# Patient Record
Sex: FEMALE | Race: OTHER
[De-identification: names, ages, dates, MRNs, and addresses within clinical notes are randomized per-mention and may not be internally consistent; named-entity substitution may affect disease eponyms.]

---

## 2020-04-30 ENCOUNTER — APPOINTMENT (OUTPATIENT)
Dept: INTERNAL MEDICINE | Facility: CLINIC | Age: 30
End: 2020-04-30
Payer: MEDICAID

## 2020-04-30 VITALS
BODY MASS INDEX: 25.61 KG/M2 | SYSTOLIC BLOOD PRESSURE: 110 MMHG | HEIGHT: 64 IN | TEMPERATURE: 98 F | HEART RATE: 81 BPM | DIASTOLIC BLOOD PRESSURE: 69 MMHG | WEIGHT: 150 LBS | RESPIRATION RATE: 16 BRPM

## 2020-04-30 DIAGNOSIS — R74.0 NONSPECIFIC ELEVATION OF LEVELS OF TRANSAMINASE AND LACTIC ACID DEHYDROGENASE [LDH]: ICD-10-CM

## 2020-04-30 DIAGNOSIS — D70.9 NEUTROPENIA, UNSPECIFIED: ICD-10-CM

## 2020-04-30 DIAGNOSIS — A90 DENGUE FEVER [CLASSICAL DENGUE]: ICD-10-CM

## 2020-04-30 DIAGNOSIS — Z83.3 FAMILY HISTORY OF DIABETES MELLITUS: ICD-10-CM

## 2020-04-30 PROCEDURE — 99203 OFFICE O/P NEW LOW 30 MIN: CPT

## 2020-04-30 NOTE — HISTORY OF PRESENT ILLNESS
[FreeTextEntry1] : This 30 y.o. woman\par who was at [facility] from 3/10/2020 - 3/15 - 2020\par for body aches and fevers, and elevated LFTs. \par \par who recently returned from [facility] on 3/8/2020.  She was there for 2 weeks on vacation.  She started getting sick while in Airplane.  NExt day, went to see PMD Dr. Atif Murphy, and then told to go hospital. \par \par She was found with Leukopenia and transaminitis in the 300s. \par \par 3/14  [facility] labs\par Hepatitis A - Ab Ig G positive\par Hepatis A IgM - negative\par hep B S Ag - negative\par hep S Ab negative\par Hep C negative \par Hep E negative\par DAVID negative\par ceruloplasmin negative\par \par Dengue fever IgM negative\par IgG positive\par \par CMV PCR negative\par \par recently started on Dicyclomine\par something for her stomach.\par \par QA  Assurance - pharmaceuticals\par \par \par Recently seen GI \par Juan Pablo Bean\par [phone]\par found with:\par \par fatty liver\par transaminitis. \par \par \par \par Currently still with some mid right abd pain\par

## 2020-04-30 NOTE — PHYSICAL EXAM
[General Appearance - Alert] : alert [General Appearance - In No Acute Distress] : in no acute distress [Sclera] : the sclera and conjunctiva were normal [PERRL With Normal Accommodation] : pupils were equal in size, round, reactive to light [Extraocular Movements] : extraocular movements were intact [Outer Ear] : the ears and nose were normal in appearance [Neck Appearance] : the appearance of the neck was normal [Oropharynx] : the oropharynx was normal with no thrush [Neck Cervical Mass (___cm)] : no neck mass was observed [Jugular Venous Distention Increased] : there was no jugular-venous distention [Thyroid Diffuse Enlargement] : the thyroid was not enlarged [Auscultation Breath Sounds / Voice Sounds] : lungs were clear to auscultation bilaterally [Heart Rate And Rhythm] : heart rate was normal and rhythm regular [Heart Sounds] : normal S1 and S2 [Heart Sounds Gallop] : no gallops [Murmurs] : no murmurs [Heart Sounds Pericardial Friction Rub] : no pericardial rub [Full Pulse] : the pedal pulses are present [Edema] : there was no peripheral edema [Bowel Sounds] : normal bowel sounds [Abdomen Soft] : soft [] : no hepato-splenomegaly [Abdomen Mass (___ Cm)] : no abdominal mass palpated [Costovertebral Angle Tenderness] : no CVA tenderness [FreeTextEntry1] : mild RIGHT abd tenderness DEEP palp.  [No Palpable Adenopathy] : no palpable adenopathy [Musculoskeletal - Swelling] : no joint swelling [Nail Clubbing] : no clubbing  or cyanosis of the fingernails [Motor Tone] : muscle strength and tone were normal [Cranial Nerves] : cranial nerves 2-12 were intact [Sensation] : the sensory exam was normal to light touch and pinprick [No Focal Deficits] : no focal deficits [Motor Exam] : the motor exam was normal [Oriented To Time, Place, And Person] : oriented to person, place, and time [Affect] : the affect was normal

## 2020-04-30 NOTE — ASSESSMENT
[FreeTextEntry1] : This 30 y.o. F\par with recent leukopenia, transaminitis, dx with Dengue fever\par \par recent returned traveler\par has positive antibodies\par \par explained to patient that if she gets Dengue fever again, she can have hemorrhagic disease and can be life threatening. \par advise mosquito repellent\par \par patient had recent labs at Dr. Lisandro Bean office. \par to fax to our office. \par \par - should follow up with CBC and LFTs if no recent labs available.

## 2020-04-30 NOTE — REVIEW OF SYSTEMS
[Body Aches] : body aches [Fever] : no fever [Chills] : no chills [As Noted in HPI] : as noted in HPI [FreeTextEntry2] : sometimes muscle pain [Negative] : Heme/Lymph

## 2024-05-05 ENCOUNTER — NON-APPOINTMENT (OUTPATIENT)
Age: 34
End: 2024-05-05

## 2024-11-17 ENCOUNTER — NON-APPOINTMENT (OUTPATIENT)
Age: 34
End: 2024-11-17